# Patient Record
Sex: FEMALE | URBAN - METROPOLITAN AREA
[De-identification: names, ages, dates, MRNs, and addresses within clinical notes are randomized per-mention and may not be internally consistent; named-entity substitution may affect disease eponyms.]

---

## 2017-03-04 ENCOUNTER — TELEPHONE (OUTPATIENT)
Dept: NURSING | Facility: CLINIC | Age: 27
End: 2017-03-04

## 2017-03-04 NOTE — TELEPHONE ENCOUNTER
"Call Type: Triage Call    Presenting Problem: Patient is \"3 weeks pregnant\" and is having light  pink discharge. Triaged for pregnancy: spontaneous termination, less  than 20 weeks /Disposition to see provider within 24 hours.  Triage Note:  Guideline Title: Pregnancy: Spontaneous Termination, Less Than 20 Weeks  Recommended Disposition: See Provider within 24 hours  Original Inclination: Wanted to speak with a nurse  Override Disposition:  Intended Action: See Dr/Jimbo Appt  Physician Contacted: No  Vaginal spotting AND pregnancy confirmed or menses more than 2 weeks late ?  YES  Passing tissue from the vagina ? NO  Moderate vaginal bleeding ? NO  Vaginal bleeding AND greater than 20 weeks gestation ? NO  Experiencing contractions AND 20-37 weeks gestation ? NO  Continuous mild vaginal bleeding ? NO  Abdominal pain AND greater than 20 weeks gestation ? NO  New or worsening signs and symptoms that may indicate shock ? NO  More than 37 weeks gestation AND contractions ? NO  Prior ectopic pregnancy AND abdominal cramping or continuous mild vaginal bleeding  ? NO  IUD in place AND vaginal bleeding or abdominal cramping ? NO  Recent positive pregnancy test or menses late AND new onset of pain on top or back  of shoulders ? NO  Unbearable abdominal, pelvic or back pain ? NO  Lower abdominal, pelvic or back pain with any bleeding ? NO  Lower abdominal, pelvic or back pain for 4 hours or more ? NO  Foul smelling or unusual vaginal discharge (such as change in color, odor, or  amount of vaginal discharge) ? NO  Cerclage (cervix sutured closed) in place AND any vaginal bleeding or fluid ? NO  Heavy vaginal bleeding (soaking 1 pad every hour for 2 hours or more) ? NO  Continuous bright red vaginal bleeding for more than 15 minutes (more than  spotting) ? NO  Persistent dizziness OR lightheadedness that does not resolve within ten minutes ?  NO  Physician Instructions:  Care Advice: Call provider if symptoms worsen or new " symptoms develop.  Bring any tissue that has passed for examination by provider.  CAUTIONS  Refrain from douching, using feminine hygiene sprays, scented deodorant  tampons, or nonprescription intravaginal medication until evaluated by a  provider.  Moderate daily activities until evaluated by provider.  Tell provider if known to be Rh-negative and have not received Rh o(D)  immune globulin. Rh o(D) immune globulin must be administered within 72  hours of pregnancy termination.